# Patient Record
Sex: MALE | Race: OTHER | NOT HISPANIC OR LATINO | URBAN - METROPOLITAN AREA
[De-identification: names, ages, dates, MRNs, and addresses within clinical notes are randomized per-mention and may not be internally consistent; named-entity substitution may affect disease eponyms.]

---

## 2019-05-13 ENCOUNTER — OUTPATIENT (OUTPATIENT)
Dept: OUTPATIENT SERVICES | Facility: HOSPITAL | Age: 24
LOS: 1 days | End: 2019-05-13
Payer: COMMERCIAL

## 2019-05-13 VITALS
HEART RATE: 64 BPM | RESPIRATION RATE: 14 BRPM | TEMPERATURE: 98 F | DIASTOLIC BLOOD PRESSURE: 87 MMHG | SYSTOLIC BLOOD PRESSURE: 137 MMHG | HEIGHT: 69 IN | OXYGEN SATURATION: 99 % | WEIGHT: 143.08 LBS

## 2019-05-13 DIAGNOSIS — Z01.818 ENCOUNTER FOR OTHER PREPROCEDURAL EXAMINATION: ICD-10-CM

## 2019-05-13 DIAGNOSIS — S43.432A SUPERIOR GLENOID LABRUM LESION OF LEFT SHOULDER, INITIAL ENCOUNTER: ICD-10-CM

## 2019-05-13 DIAGNOSIS — Z98.890 OTHER SPECIFIED POSTPROCEDURAL STATES: Chronic | ICD-10-CM

## 2019-05-13 LAB
HCT VFR BLD CALC: 47.4 % — SIGNIFICANT CHANGE UP (ref 39–50)
HGB BLD-MCNC: 15.6 G/DL — SIGNIFICANT CHANGE UP (ref 13–17)
HIV 1+2 AB+HIV1 P24 AG SERPL QL IA: SIGNIFICANT CHANGE UP
MCHC RBC-ENTMCNC: 30.2 PG — SIGNIFICANT CHANGE UP (ref 27–34)
MCHC RBC-ENTMCNC: 32.9 GM/DL — SIGNIFICANT CHANGE UP (ref 32–36)
MCV RBC AUTO: 91.7 FL — SIGNIFICANT CHANGE UP (ref 80–100)
NRBC # BLD: 0 /100 WBCS — SIGNIFICANT CHANGE UP (ref 0–0)
PLATELET # BLD AUTO: 227 K/UL — SIGNIFICANT CHANGE UP (ref 150–400)
RBC # BLD: 5.17 M/UL — SIGNIFICANT CHANGE UP (ref 4.2–5.8)
RBC # FLD: 11.9 % — SIGNIFICANT CHANGE UP (ref 10.3–14.5)
WBC # BLD: 5.37 K/UL — SIGNIFICANT CHANGE UP (ref 3.8–10.5)
WBC # FLD AUTO: 5.37 K/UL — SIGNIFICANT CHANGE UP (ref 3.8–10.5)

## 2019-05-13 PROCEDURE — G0463: CPT

## 2019-05-13 PROCEDURE — 85027 COMPLETE CBC AUTOMATED: CPT

## 2019-05-13 PROCEDURE — 36415 COLL VENOUS BLD VENIPUNCTURE: CPT

## 2019-05-13 PROCEDURE — 87389 HIV-1 AG W/HIV-1&-2 AB AG IA: CPT

## 2019-05-13 NOTE — H&P PST ADULT - NSICDXPROBLEM_GEN_ALL_CORE_FT
PROBLEM DIAGNOSES  Problem: Superior glenoid labrum lesion of left shoulder, initial encounter  Assessment and Plan:   PST Labs; CBC - No Medical Clearance Needed - Pt instructed to stop any NSAIDS/Herbal Supplements one week prior to procedure - May take Tylenol as needed for pain - Pre-op instructions as well as pre-op wash instructions given to pt with understanding verbalized

## 2019-05-13 NOTE — H&P PST ADULT - OPHTHALMOLOGIC COMMENTS
Notes new Stye to LEFT Eye - denies any drainage -states he is going to urgent care following PST exam for eye exam due to upcoming procedure

## 2019-05-13 NOTE — H&P PST ADULT - MUSCULOSKELETAL COMMENTS
Decreased ROM/Strength LEFT Shoulder - denies tenderness on palpation LEFT Shoulder Discomfort - SEE HPI

## 2019-05-13 NOTE — H&P PST ADULT - NEGATIVE ENMT SYMPTOMS
no tinnitus/no post-nasal discharge/no hearing difficulty/no throat pain/no dysphagia/no sinus symptoms/no nasal congestion/no vertigo

## 2019-05-13 NOTE — H&P PST ADULT - HISTORY OF PRESENT ILLNESS
23 year old male presents for PST prior to LEFT Shoulder Arthroscopy - Labral Repair - Rotator Cuff Repair - Remplissage with Dr Neville Isabel on 5/16/19 - pt notes 23 year old male presents for PST prior to LEFT Shoulder Arthroscopy - Labral Repair - Rotator Cuff Repair - Remplissage with Dr Neville Isabel on 5/16/19 - pt notes he got "hurt at work March 21st 2019 when a ladder fell on me and it popped my LEFT shoulder out."  Pt notes he works for [employer] in [facility] - following incident he was seen that day at [facility] in [facility] xrays were obtained and they" knocked me out and manipulated my shoulder back in." Pt has since been to see Dr Isabel - has had MRI - notes decreased ROM and strength to LEFT Shoulder - was out of work 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

## 2019-05-13 NOTE — H&P PST ADULT - NSICDXPASTMEDICALHX_GEN_ALL_CORE_FT
PAST MEDICAL HISTORY:  Right inguinal hernia     Superior glenoid labrum lesion of left shoulder, initial encounter PAST MEDICAL HISTORY:  H/O seasonal allergies     Right inguinal hernia     Superior glenoid labrum lesion of left shoulder, initial encounter

## 2019-05-13 NOTE — H&P PST ADULT - ASSESSMENT
23 year old male with Superior Glenoid Labrum Lesion of LEFT shoulder, initial encounter - Scheduled for LEFT Shoulder Arthroscopy - Labral Repair - Rotator Cuff Repair - Remplissage with Dr Neville Isabel on 5/16/19 -

## 2019-05-15 ENCOUNTER — TRANSCRIPTION ENCOUNTER (OUTPATIENT)
Age: 24
End: 2019-05-15

## 2019-05-16 ENCOUNTER — RESULT REVIEW (OUTPATIENT)
Age: 24
End: 2019-05-16

## 2019-05-16 ENCOUNTER — OUTPATIENT (OUTPATIENT)
Dept: OUTPATIENT SERVICES | Facility: HOSPITAL | Age: 24
LOS: 1 days | End: 2019-05-16
Payer: COMMERCIAL

## 2019-05-16 VITALS
HEART RATE: 80 BPM | RESPIRATION RATE: 15 BRPM | SYSTOLIC BLOOD PRESSURE: 132 MMHG | OXYGEN SATURATION: 98 % | WEIGHT: 143.08 LBS | DIASTOLIC BLOOD PRESSURE: 88 MMHG | HEIGHT: 69 IN | TEMPERATURE: 98 F

## 2019-05-16 VITALS
RESPIRATION RATE: 12 BRPM | OXYGEN SATURATION: 96 % | HEART RATE: 79 BPM | SYSTOLIC BLOOD PRESSURE: 147 MMHG | DIASTOLIC BLOOD PRESSURE: 78 MMHG

## 2019-05-16 DIAGNOSIS — Z98.890 OTHER SPECIFIED POSTPROCEDURAL STATES: Chronic | ICD-10-CM

## 2019-05-16 DIAGNOSIS — S43.432A SUPERIOR GLENOID LABRUM LESION OF LEFT SHOULDER, INITIAL ENCOUNTER: ICD-10-CM

## 2019-05-16 PROCEDURE — 88304 TISSUE EXAM BY PATHOLOGIST: CPT

## 2019-05-16 PROCEDURE — 29806 SHO ARTHRS SRG CAPSULORRAPHY: CPT | Mod: LT

## 2019-05-16 PROCEDURE — 29807 SHO ARTHRS SRG RPR SLAP LES: CPT | Mod: LT

## 2019-05-16 PROCEDURE — C1713: CPT

## 2019-05-16 PROCEDURE — 29823 SHO ARTHRS SRG XTNSV DBRDMT: CPT | Mod: LT

## 2019-05-16 PROCEDURE — 88304 TISSUE EXAM BY PATHOLOGIST: CPT | Mod: 26

## 2019-05-16 RX ORDER — OXYCODONE HYDROCHLORIDE 5 MG/1
5 TABLET ORAL ONCE
Refills: 0 | Status: DISCONTINUED | OUTPATIENT
Start: 2019-05-16 | End: 2019-05-16

## 2019-05-16 RX ORDER — HYDROMORPHONE HYDROCHLORIDE 2 MG/ML
1 INJECTION INTRAMUSCULAR; INTRAVENOUS; SUBCUTANEOUS
Refills: 0 | Status: DISCONTINUED | OUTPATIENT
Start: 2019-05-16 | End: 2019-05-16

## 2019-05-16 RX ORDER — CEFAZOLIN SODIUM 1 G
2000 VIAL (EA) INJECTION ONCE
Refills: 0 | Status: COMPLETED | OUTPATIENT
Start: 2019-05-16 | End: 2019-05-16

## 2019-05-16 RX ORDER — OXYCODONE HYDROCHLORIDE 5 MG/1
10 TABLET ORAL ONCE
Refills: 0 | Status: DISCONTINUED | OUTPATIENT
Start: 2019-05-16 | End: 2019-05-16

## 2019-05-16 RX ORDER — SODIUM CHLORIDE 9 MG/ML
1000 INJECTION, SOLUTION INTRAVENOUS
Refills: 0 | Status: DISCONTINUED | OUTPATIENT
Start: 2019-05-16 | End: 2019-05-16

## 2019-05-16 RX ORDER — HYDROMORPHONE HYDROCHLORIDE 2 MG/ML
0.5 INJECTION INTRAMUSCULAR; INTRAVENOUS; SUBCUTANEOUS
Refills: 0 | Status: DISCONTINUED | OUTPATIENT
Start: 2019-05-16 | End: 2019-05-16

## 2019-05-16 RX ORDER — METOCLOPRAMIDE HCL 10 MG
10 TABLET ORAL ONCE
Refills: 0 | Status: DISCONTINUED | OUTPATIENT
Start: 2019-05-16 | End: 2019-05-16

## 2019-05-16 RX ADMIN — HYDROMORPHONE HYDROCHLORIDE 0.5 MILLIGRAM(S): 2 INJECTION INTRAMUSCULAR; INTRAVENOUS; SUBCUTANEOUS at 11:48

## 2019-05-16 RX ADMIN — SODIUM CHLORIDE 75 MILLILITER(S): 9 INJECTION, SOLUTION INTRAVENOUS at 06:30

## 2019-05-16 RX ADMIN — HYDROMORPHONE HYDROCHLORIDE 0.5 MILLIGRAM(S): 2 INJECTION INTRAMUSCULAR; INTRAVENOUS; SUBCUTANEOUS at 11:58

## 2019-05-16 RX ADMIN — HYDROMORPHONE HYDROCHLORIDE 0.5 MILLIGRAM(S): 2 INJECTION INTRAMUSCULAR; INTRAVENOUS; SUBCUTANEOUS at 11:28

## 2019-05-16 RX ADMIN — SODIUM CHLORIDE 100 MILLILITER(S): 9 INJECTION, SOLUTION INTRAVENOUS at 11:29

## 2019-05-16 RX ADMIN — HYDROMORPHONE HYDROCHLORIDE 0.5 MILLIGRAM(S): 2 INJECTION INTRAMUSCULAR; INTRAVENOUS; SUBCUTANEOUS at 11:38

## 2019-05-16 NOTE — ASU DISCHARGE PLAN (ADULT/PEDIATRIC) - ASU DC SPECIAL INSTRUCTIONSFT
Shoulder Arthroscopy Instructions    1) Your shoulder may swell over the next 48hours and you can expect pain to get a bit worse. Ice it plenty, continuously if possible. Fill up a plastic bag, then wrap it in a towel or pillow case and lay it on your shoulder.     2) Elevate your hand when you can, and wiggle your fingers as often as you think of it. Otherwise you should be up and about, walking as much as you can tolerate. The more you move the better you will do. No weight bearing on the arm yet.    3) Expect some drainage onto the bandage. It is normal. It may even soak through and look bloody. This is mostly leftover Saline fluid coming out from surgery. Even a drop of blood can make it look very bloody. Just place another Gauze over it. If it bleeds through the second bandage again, call.    4) Remove bandage in 72hrs and place waterproof band aides. You can shower in 72 hours. No bath. Pat your incisions dry. No creams, no lotions.    5) Only reason to worry would be if pain got so severe that you cannot feel or wiggle your fingers. In this case you need to call or come to the ER. But as long as you can feel or wiggle your fingers, you are fine.    6) Call the office to schedule a follow up appointment to be seen in 10-14 days. Your Sutures will be removed at that point.    7) A pain Rx was sent electronically to your pharmacy, pick it up on the way home. Shoulder Arthroscopy Instructions    1) Your shoulder may swell over the next 48hours and you can expect pain to get a bit worse. Ice it plenty, continuously if possible. Fill up a plastic bag, then wrap it in a towel or pillow case and lay it on your shoulder.     2) Elevate your hand when you can, and wiggle your fingers as often as you think of it. Otherwise you should be up and about, walking as much as you can tolerate. The more you move the better you will do. No weight bearing on the arm yet.    3) Expect some drainage onto the bandage. It is normal. It may even soak through and look bloody. This is mostly leftover Saline fluid coming out from surgery. Even a drop of blood can make it look very bloody. Just place another Gauze over it. If it bleeds through the second bandage again, call.    4) Remove bandage in 72hrs and place waterproof band aides. You can shower in 72 hours. No bath. Pat your incisions dry. No creams, no lotions.    5) Only reason to worry would be if pain got so severe that you cannot feel or wiggle your fingers. In this case you need to call or come to the ER. But as long as you can feel or wiggle your fingers, you are fine.    6) Call the office to schedule a follow up appointment to be seen in 10-14 days. Your Sutures will be removed at that point.    7) A pain Rx was sent by Dr. Isabel's office.

## 2019-05-16 NOTE — ASU PATIENT PROFILE, ADULT - PMH
H/O seasonal allergies    Right inguinal hernia    Superior glenoid labrum lesion of left shoulder, initial encounter

## 2019-05-16 NOTE — ASU DISCHARGE PLAN (ADULT/PEDIATRIC) - CALL YOUR DOCTOR IF YOU HAVE ANY OF THE FOLLOWING:
Fever greater than (need to indicate Fahrenheit or Celsius)/Numbness, tingling, color or temperature change to extremity/Bleeding that does not stop/Pain not relieved by Medications

## 2019-05-16 NOTE — BRIEF OPERATIVE NOTE - NSICDXBRIEFPOSTOP_GEN_ALL_CORE_FT
POST-OP DIAGNOSIS:  Hill-Sachs lesion of left shoulder 16-May-2019 11:56:26  Lucio Lechuga  Bankart lesion of left shoulder 16-May-2019 11:56:14  Lucio Lechuga

## 2019-05-16 NOTE — BRIEF OPERATIVE NOTE - NSICDXBRIEFPROCEDURE_GEN_ALL_CORE_FT
PROCEDURES:  Arthroscopy, shoulder, with posterior remplissage procedure 16-May-2019 11:55:38  Lucio Lechuga  Labral repair of left shoulder 16-May-2019 11:55:29  Lucio Lechuga

## 2019-05-16 NOTE — ASU DISCHARGE PLAN (ADULT/PEDIATRIC) - CARE PROVIDER_API CALL
Neville Isabel (MD)  Surgery  [address] [place] [address]  Phone: [phone]  Fax: [phone]  Follow Up Time: 2 weeks

## 2019-05-16 NOTE — BRIEF OPERATIVE NOTE - NSICDXBRIEFPREOP_GEN_ALL_CORE_FT
PRE-OP DIAGNOSIS:  Hill-Sachs lesion of left shoulder 16-May-2019 11:56:04  Lucio Lechuga  Bankart lesion of left shoulder 16-May-2019 11:55:48  Lucio Lechuga

## 2019-05-16 NOTE — ASU DISCHARGE PLAN (ADULT/PEDIATRIC) - PAIN MANAGEMENT
Prescription called to pharmacy Prescriptions electronically submitted to pharmacy from doctor's office

## 2020-02-04 NOTE — H&P PST ADULT - PHYSICIAN'S ASSESSMENT OF RISK
If you are a smoker, it is important for your health to stop smoking. Please be aware that second hand smoke is also harmful.
No Risk

## 2022-09-06 NOTE — ASU PATIENT PROFILE, ADULT - VISION (WITH CORRECTIVE LENSES IF THE PATIENT USUALLY WEARS THEM):
Normal vision: sees adequately in most situations; can see medication labels, newsprint
Render Note In Bullet Format When Appropriate: No
Consent: The patient's consent was obtained including but not limited to risks of crusting, scabbing, blistering, scarring, darker or lighter pigmentary change, recurrence, incomplete removal and infection.
Application Tool (Optional): Liquid Nitrogen Sprayer
Duration Of Freeze Thaw-Cycle (Seconds): 5
Detail Level: Detailed
Show Applicator Variable?: Yes
Number Of Freeze-Thaw Cycles: 1 freeze-thaw cycle
Post-Care Instructions: I reviewed with the patient in detail post-care instructions. Patient is to wear sunprotection, and avoid picking at any of the treated lesions. Pt may apply Vaseline to crusted or scabbing areas.

## 2023-12-04 ENCOUNTER — EMERGENCY (EMERGENCY)
Facility: HOSPITAL | Age: 28
LOS: 1 days | Discharge: LEFT BEFORE DISCHARGE | End: 2023-12-04
Attending: STUDENT IN AN ORGANIZED HEALTH CARE EDUCATION/TRAINING PROGRAM | Admitting: STUDENT IN AN ORGANIZED HEALTH CARE EDUCATION/TRAINING PROGRAM
Payer: SELF-PAY

## 2023-12-04 VITALS
TEMPERATURE: 95 F | RESPIRATION RATE: 20 BRPM | HEIGHT: 69 IN | WEIGHT: 130.07 LBS | HEART RATE: 128 BPM | DIASTOLIC BLOOD PRESSURE: 86 MMHG | SYSTOLIC BLOOD PRESSURE: 128 MMHG | OXYGEN SATURATION: 98 %

## 2023-12-04 DIAGNOSIS — Z98.890 OTHER SPECIFIED POSTPROCEDURAL STATES: Chronic | ICD-10-CM

## 2023-12-04 PROCEDURE — 73030 X-RAY EXAM OF SHOULDER: CPT

## 2023-12-04 PROCEDURE — 96374 THER/PROPH/DIAG INJ IV PUSH: CPT | Mod: XU

## 2023-12-04 PROCEDURE — 73030 X-RAY EXAM OF SHOULDER: CPT | Mod: 26,LT

## 2023-12-04 PROCEDURE — 23650 CLTX SHO DSLC W/MNPJ WO ANES: CPT | Mod: LT

## 2023-12-04 PROCEDURE — 96375 TX/PRO/DX INJ NEW DRUG ADDON: CPT | Mod: XU

## 2023-12-04 PROCEDURE — 99285 EMERGENCY DEPT VISIT HI MDM: CPT

## 2023-12-04 PROCEDURE — 99284 EMERGENCY DEPT VISIT MOD MDM: CPT | Mod: 25

## 2023-12-04 RX ORDER — LIDOCAINE HCL 20 MG/ML
20 VIAL (ML) INJECTION ONCE
Refills: 0 | Status: COMPLETED | OUTPATIENT
Start: 2023-12-04 | End: 2023-12-04

## 2023-12-04 RX ORDER — DIAZEPAM 5 MG
5 TABLET ORAL ONCE
Refills: 0 | Status: DISCONTINUED | OUTPATIENT
Start: 2023-12-04 | End: 2023-12-04

## 2023-12-04 RX ORDER — SODIUM CHLORIDE 9 MG/ML
1000 INJECTION INTRAMUSCULAR; INTRAVENOUS; SUBCUTANEOUS ONCE
Refills: 0 | Status: COMPLETED | OUTPATIENT
Start: 2023-12-04 | End: 2023-12-04

## 2023-12-04 RX ORDER — KETOROLAC TROMETHAMINE 30 MG/ML
30 SYRINGE (ML) INJECTION ONCE
Refills: 0 | Status: DISCONTINUED | OUTPATIENT
Start: 2023-12-04 | End: 2023-12-04

## 2023-12-04 RX ADMIN — Medication 5 MILLIGRAM(S): at 19:10

## 2023-12-04 RX ADMIN — SODIUM CHLORIDE 1000 MILLILITER(S): 9 INJECTION INTRAMUSCULAR; INTRAVENOUS; SUBCUTANEOUS at 19:18

## 2023-12-04 RX ADMIN — Medication 30 MILLIGRAM(S): at 19:18

## 2023-12-04 RX ADMIN — Medication 20 MILLILITER(S): at 19:55

## 2023-12-04 NOTE — ED PROVIDER NOTE - DIFFERENTIAL DIAGNOSIS
Differential Diagnosis Shoulder fracture, clavicle fracture, shoulder dislocation, soft tissue injury

## 2023-12-04 NOTE — ED PROVIDER NOTE - PHYSICAL EXAMINATION
Vital signs as available reviewed.  General:  Comfortable, no acute distress.  Head:  Normocephalic, atraumatic.  Eyes:  Conjunctiva pink, no icterus. Pupils equal, round, reactive to light, extra-occular eye movements intact.  Cardiovascular:  Regular rate, no obvious murmur.  Respiratory:  Clear to auscultation, good air entry bilaterally.  Abdomen:  Soft, non-tender.  Musculoskeletal:  No tenderness to palpation over c/t/l spine. No tenderness to palpation over chest wall, clavicles,  upper/lower arms, hip/pelvis, upper / lower legs. left shoulder with deformity. sensation normal including  badge area. 5/5 distal  strength.  Neurologic: Alert and oriented, moving all extremities. Sensation intact.  Skin:  Warm and dry. No skin break.

## 2023-12-04 NOTE — ED PROVIDER NOTE - PROGRESS NOTE DETAILS
X-ray with dislocation, multiple relocation techniques attempted.  Plan to call Ortho for assistance, however prior to that ortho evaluation patient informed nursing that he was leaving.  IV removed and patient walked out.

## 2023-12-04 NOTE — ED PROCEDURE NOTE - PROCEDURE ADDITIONAL DETAILS
Shoulder reduction attempted using multiple techniques including external rotation, scapular manipulation, traction countertraction, self reduction.  Reduction unsuccessful.

## 2023-12-04 NOTE — ED ADULT NURSE NOTE - NS ED NURSE ELOPE COMMENTS
Pt states" I'm going to [facility]" Pt states" I'm going to [facility]"

## 2023-12-04 NOTE — ED PROVIDER NOTE - NSICDXPASTMEDICALHX_GEN_ALL_CORE_FT
PAST MEDICAL HISTORY:  H/O seasonal allergies     Right inguinal hernia     Superior glenoid labrum lesion of left shoulder, initial encounter

## 2023-12-04 NOTE — ED ADULT NURSE NOTE - NSFALLRISKINTERV_ED_ALL_ED
Communicate fall risk and risk factors to all staff, patient, and family/Provide visual cue: yellow wristband, yellow gown, etc/Reinforce activity limits and safety measures with patient and family/Call bell, personal items and telephone in reach/Instruct patient to call for assistance before getting out of bed/chair/stretcher/Non-slip footwear applied when patient is off stretcher/[place] to call system/Physically safe environment - no spills, clutter or unnecessary equipment/Purposeful Proactive Rounding/Room/bathroom lighting operational, light cord in reach Communicate fall risk and risk factors to all staff, patient, and family/Provide visual cue: yellow wristband, yellow gown, etc/Reinforce activity limits and safety measures with patient and family/Call bell, personal items and telephone in reach/Instruct patient to call for assistance before getting out of bed/chair/stretcher/Non-slip footwear applied when patient is off stretcher/[place] to call system/Physically safe environment - no spills, clutter or unnecessary equipment/Purposeful Proactive Rounding/Room/bathroom lighting operational, light cord in reach

## 2023-12-04 NOTE — ED PROVIDER NOTE - OBJECTIVE STATEMENT
28-year-old male history of prior shoulder dislocations here after falling down approximately 12 steps and hurting his shoulder.  Patient states he feels his shoulder is dislocated again.  No head strike or loss of consciousness.  Patient denies other injury.  Patient did not take anything prior to arrival.  No PMD.  Orthopedist is Dr. Isabel

## 2023-12-04 NOTE — ED ADULT TRIAGE NOTE - CHIEF COMPLAINT QUOTE
patient fell down a flight of stairs and states left shoulder is dislocated (he states it has been dislocated previously)  radial pulse palpable, patient refuses sling

## 2023-12-04 NOTE — ED PROVIDER NOTE - CLINICAL SUMMARY MEDICAL DECISION MAKING FREE TEXT BOX
Here with shoulder pain after fall, patient feels he has dislocated his shoulder again.  We will get imaging, treat pain, re-eval.

## 2023-12-05 PROBLEM — S43.432A SUPERIOR GLENOID LABRUM LESION OF LEFT SHOULDER, INITIAL ENCOUNTER: Chronic | Status: ACTIVE | Noted: 2019-05-13

## 2023-12-05 PROBLEM — Z88.9 ALLERGY STATUS TO UNSPECIFIED DRUGS, MEDICAMENTS AND BIOLOGICAL SUBSTANCES: Chronic | Status: ACTIVE | Noted: 2019-05-13
